# Patient Record
Sex: MALE | Race: WHITE | NOT HISPANIC OR LATINO | Employment: OTHER | URBAN - METROPOLITAN AREA
[De-identification: names, ages, dates, MRNs, and addresses within clinical notes are randomized per-mention and may not be internally consistent; named-entity substitution may affect disease eponyms.]

---

## 2023-02-02 PROBLEM — R53.83 FATIGUE: Status: ACTIVE | Noted: 2023-02-02

## 2023-02-02 PROBLEM — L72.3 SEBACEOUS CYST: Status: ACTIVE | Noted: 2023-02-02

## 2023-02-02 PROBLEM — M19.90 ARTHRITIS: Status: ACTIVE | Noted: 2023-02-02

## 2023-02-02 PROBLEM — M48.062 NEUROGENIC CLAUDICATION DUE TO LUMBAR SPINAL STENOSIS: Status: ACTIVE | Noted: 2023-02-02

## 2023-02-02 PROBLEM — C67.9 MALIGNANT NEOPLASM OF BLADDER, UNSPECIFIED (MULTI): Status: ACTIVE | Noted: 2023-02-02

## 2023-02-02 PROBLEM — E66.3 OVERWEIGHT WITH BODY MASS INDEX (BMI) OF 27 TO 27.9 IN ADULT: Status: ACTIVE | Noted: 2023-02-02

## 2023-02-02 PROBLEM — N18.31 STAGE 3A CHRONIC KIDNEY DISEASE (MULTI): Status: ACTIVE | Noted: 2023-02-02

## 2023-02-02 PROBLEM — E11.9 DIABETES MELLITUS (MULTI): Status: ACTIVE | Noted: 2023-02-02

## 2023-02-02 PROBLEM — G62.9 PERIPHERAL NEUROPATHY: Status: ACTIVE | Noted: 2023-02-02

## 2023-02-02 PROBLEM — E03.9 HYPOTHYROIDISM: Status: ACTIVE | Noted: 2023-02-02

## 2023-02-02 PROBLEM — Z85.46 PERSONAL HISTORY OF MALIGNANT NEOPLASM OF PROSTATE: Status: ACTIVE | Noted: 2023-02-02

## 2023-02-02 PROBLEM — Z86.79 PERSONAL HISTORY OF SUBDURAL HEMATOMA: Status: ACTIVE | Noted: 2023-02-02

## 2023-02-02 PROBLEM — Z86.79 HISTORY OF MITRAL VALVE PROLAPSE: Status: ACTIVE | Noted: 2023-02-02

## 2023-02-02 RX ORDER — FINASTERIDE 1 MG/1
TABLET, FILM COATED ORAL
COMMUNITY

## 2023-02-02 RX ORDER — LEVOTHYROXINE SODIUM 88 UG/1
1 TABLET ORAL DAILY
COMMUNITY
Start: 2019-11-21 | End: 2023-08-14 | Stop reason: SDUPTHER

## 2023-02-02 RX ORDER — ASCORBIC ACID 250 MG
TABLET,CHEWABLE ORAL
COMMUNITY

## 2023-02-02 RX ORDER — PROPRANOLOL HYDROCHLORIDE 10 MG/1
10 TABLET ORAL 2 TIMES DAILY
COMMUNITY
End: 2023-06-15 | Stop reason: SDUPTHER

## 2023-02-02 RX ORDER — UBIDECARENONE 75 MG
CAPSULE ORAL
COMMUNITY
End: 2023-03-16 | Stop reason: SDUPTHER

## 2023-03-07 ENCOUNTER — NURSING HOME VISIT (OUTPATIENT)
Dept: POST ACUTE CARE | Facility: EXTERNAL LOCATION | Age: 88
End: 2023-03-07
Payer: MEDICARE

## 2023-03-07 DIAGNOSIS — R53.1 WEAKNESS: Primary | ICD-10-CM

## 2023-03-07 DIAGNOSIS — M19.90 ARTHRITIS: ICD-10-CM

## 2023-03-07 PROCEDURE — 99308 SBSQ NF CARE LOW MDM 20: CPT | Performed by: NURSE PRACTITIONER

## 2023-03-07 ASSESSMENT — ENCOUNTER SYMPTOMS
DYSURIA: 0
FREQUENCY: 1
FATIGUE: 0
DIFFICULTY URINATING: 0
GASTROINTESTINAL NEGATIVE: 1
FEVER: 0
RESPIRATORY NEGATIVE: 1
CARDIOVASCULAR NEGATIVE: 1
FLANK PAIN: 0
APPETITE CHANGE: 1

## 2023-03-07 NOTE — LETTER
Subjective  Patient ID: Bruce Hernandez is a 89 y.o. male who presents for No chief complaint on file..  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   Resident also has a history of DMII, iron deficiency, insomnia.  Resident was admitted to [facility] rehab unit due to his weakness and confusion after recent dischg from the hospital.  Resident states he is doing much better.  Feels stronger since working with PT.  He can get up with his walker.  States he has pain in lower back and obtained organic pain cream from [facility] which is successful in treating his pain along with occasional Tylenol.  Resident is expecting to go home on Friday (3 days).          Review of Systems   Constitutional:  Positive for appetite change. Negative for fatigue and fever.        States his appetite has improved since admission at [facility].  Reports he is eating almost all of his meals.     HENT: Negative.     Respiratory: Negative.     Cardiovascular: Negative.    Gastrointestinal: Negative.    Genitourinary:  Positive for frequency. Negative for difficulty urinating, dysuria, flank pain and urgency.   Musculoskeletal:  Positive for gait problem.       Objective  Physical Exam  Cardiovascular:      Rate and Rhythm: Normal rate and regular rhythm.      Pulses: Normal pulses.      Heart sounds: Normal heart sounds.      Comments: No edema noted of lower extremities.   Pulmonary:      Effort: Pulmonary effort is normal.      Breath sounds: Normal breath sounds.   Abdominal:      General: Abdomen is flat. Bowel sounds are normal.   Musculoskeletal:      Comments: Upper and lower strength 4/5.  Uses walker at night.  States he uses his cane during the day.     Neurological:      Mental Status: He is alert.   Psychiatric:         Mood and Affect: Mood normal.         Behavior: Behavior normal.          Assessment/Plan  Problem List Items Addressed This Visit    None  Visit Diagnoses       Weakness    -  Primary        Resident improved.  Resident plans on going home on Friday.  Will discuss details with .  Continue same regimen at this time.

## 2023-03-07 NOTE — PROGRESS NOTES
Subjective   Patient ID: Bruce Hernandez is a 89 y.o. male who presents for No chief complaint on file..  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   Resident also has a history of DMII, iron deficiency, insomnia.  Resident was admitted to [facility] rehab unit due to his weakness and confusion after recent dischg from the hospital.  Resident states he is doing much better.  Feels stronger since working with PT.  He can get up with his walker.  States he has pain in lower back and obtained organic pain cream from [facility] which is successful in treating his pain along with occasional Tylenol.  Resident is expecting to go home on Friday (3 days).          Review of Systems   Constitutional:  Positive for appetite change. Negative for fatigue and fever.        States his appetite has improved since admission at [facility].  Reports he is eating almost all of his meals.     HENT: Negative.     Respiratory: Negative.     Cardiovascular: Negative.    Gastrointestinal: Negative.    Genitourinary:  Positive for frequency. Negative for difficulty urinating, dysuria, flank pain and urgency.   Musculoskeletal:  Positive for gait problem.       Objective   Physical Exam  Cardiovascular:      Rate and Rhythm: Normal rate and regular rhythm.      Pulses: Normal pulses.      Heart sounds: Normal heart sounds.      Comments: No edema noted of lower extremities.   Pulmonary:      Effort: Pulmonary effort is normal.      Breath sounds: Normal breath sounds.   Abdominal:      General: Abdomen is flat. Bowel sounds are normal.   Musculoskeletal:      Comments: Upper and lower strength 4/5.  Uses walker at night.  States he uses his cane during the day.     Neurological:      Mental Status: He is alert.   Psychiatric:         Mood and Affect: Mood normal.         Behavior: Behavior normal.          Assessment/Plan   Problem List Items Addressed This Visit    None  Visit Diagnoses       Weakness    -  Primary        Resident improved.  Resident plans on going home on Friday.  Will discuss details with .  Continue same regimen at this time.

## 2023-03-13 ENCOUNTER — TELEPHONE (OUTPATIENT)
Dept: PRIMARY CARE | Facility: CLINIC | Age: 88
End: 2023-03-13

## 2023-03-13 NOTE — TELEPHONE ENCOUNTER
Aby with [facility] calls in asking if PRASHANT will follow and sign for SN, PT, and OT orders.   Janel can be reached at [phone].

## 2023-03-13 NOTE — TELEPHONE ENCOUNTER
Phone call to Aby with detailed message left on vm regarding above.   Aby to the office with questions/concerns.

## 2023-03-15 LAB
ANION GAP IN SER/PLAS: 13 MMOL/L (ref 10–20)
CALCIUM (MG/DL) IN SER/PLAS: 9.5 MG/DL (ref 8.6–10.3)
CARBON DIOXIDE, TOTAL (MMOL/L) IN SER/PLAS: 24 MMOL/L (ref 21–32)
CHLORIDE (MMOL/L) IN SER/PLAS: 103 MMOL/L (ref 98–107)
CREATININE (MG/DL) IN SER/PLAS: 1.77 MG/DL (ref 0.5–1.3)
GFR MALE: 36 ML/MIN/1.73M2
GLUCOSE (MG/DL) IN SER/PLAS: 125 MG/DL (ref 74–99)
POTASSIUM (MMOL/L) IN SER/PLAS: 4.4 MMOL/L (ref 3.5–5.3)
SODIUM (MMOL/L) IN SER/PLAS: 136 MMOL/L (ref 136–145)
URATE (MG/DL) IN SER/PLAS: 8.5 MG/DL (ref 4–7.5)
UREA NITROGEN (MG/DL) IN SER/PLAS: 33 MG/DL (ref 6–23)

## 2023-03-16 ENCOUNTER — OFFICE VISIT (OUTPATIENT)
Dept: PRIMARY CARE | Facility: CLINIC | Age: 88
End: 2023-03-16
Payer: MEDICARE

## 2023-03-16 VITALS — WEIGHT: 174.8 LBS | HEIGHT: 68 IN | BODY MASS INDEX: 26.49 KG/M2

## 2023-03-16 DIAGNOSIS — N18.31 STAGE 3A CHRONIC KIDNEY DISEASE (MULTI): ICD-10-CM

## 2023-03-16 DIAGNOSIS — E11.22 TYPE 2 DIABETES MELLITUS WITH STAGE 3 CHRONIC KIDNEY DISEASE, WITHOUT LONG-TERM CURRENT USE OF INSULIN, UNSPECIFIED WHETHER STAGE 3A OR 3B CKD (MULTI): ICD-10-CM

## 2023-03-16 DIAGNOSIS — Z86.79 PERSONAL HISTORY OF SUBDURAL HEMATOMA: ICD-10-CM

## 2023-03-16 DIAGNOSIS — S06.5X9A TRAUMATIC SUBDURAL HEMORRHAGE WITH LOSS OF CONSCIOUSNESS OF UNSPECIFIED DURATION, INITIAL ENCOUNTER (MULTI): ICD-10-CM

## 2023-03-16 DIAGNOSIS — D50.9 IRON DEFICIENCY ANEMIA, UNSPECIFIED IRON DEFICIENCY ANEMIA TYPE: Primary | ICD-10-CM

## 2023-03-16 DIAGNOSIS — E03.9 ACQUIRED HYPOTHYROIDISM: ICD-10-CM

## 2023-03-16 DIAGNOSIS — N18.30 TYPE 2 DIABETES MELLITUS WITH STAGE 3 CHRONIC KIDNEY DISEASE, WITHOUT LONG-TERM CURRENT USE OF INSULIN, UNSPECIFIED WHETHER STAGE 3A OR 3B CKD (MULTI): ICD-10-CM

## 2023-03-16 DIAGNOSIS — C67.9 MALIGNANT NEOPLASM OF URINARY BLADDER, UNSPECIFIED SITE (MULTI): ICD-10-CM

## 2023-03-16 PROCEDURE — 1157F ADVNC CARE PLAN IN RCRD: CPT | Performed by: FAMILY MEDICINE

## 2023-03-16 PROCEDURE — 1159F MED LIST DOCD IN RCRD: CPT | Performed by: FAMILY MEDICINE

## 2023-03-16 PROCEDURE — 1036F TOBACCO NON-USER: CPT | Performed by: FAMILY MEDICINE

## 2023-03-16 PROCEDURE — 99214 OFFICE O/P EST MOD 30 MIN: CPT | Performed by: FAMILY MEDICINE

## 2023-03-16 PROCEDURE — 1160F RVW MEDS BY RX/DR IN RCRD: CPT | Performed by: FAMILY MEDICINE

## 2023-03-16 RX ORDER — FERROUS SULFATE 325(65) MG
65 TABLET, DELAYED RELEASE (ENTERIC COATED) ORAL
Qty: 90 TABLET | Refills: 1 | Status: SHIPPED | OUTPATIENT
Start: 2023-03-16 | End: 2023-07-21 | Stop reason: SDUPTHER

## 2023-03-16 RX ORDER — DOCUSATE SODIUM 100 MG/1
100 CAPSULE, LIQUID FILLED ORAL 2 TIMES DAILY
COMMUNITY
Start: 2013-03-26 | End: 2024-02-07 | Stop reason: ALTCHOICE

## 2023-03-16 RX ORDER — LANOLIN ALCOHOL/MO/W.PET/CERES
1000 CREAM (GRAM) TOPICAL
COMMUNITY
Start: 2013-03-26

## 2023-03-16 ASSESSMENT — ENCOUNTER SYMPTOMS: FATIGUE: 1

## 2023-03-16 NOTE — PATIENT INSTRUCTIONS
Continue current medications - will add iron.  Check labs in 2 weeks to reevaluate kidney function.  Follow up here in 3 months, sooner if needed.

## 2023-03-16 NOTE — TELEPHONE ENCOUNTER
Byron with [facility] message that pt will be seen for PT x2/3 weeks and then x1/3weeks.     [phone].

## 2023-03-16 NOTE — PROGRESS NOTES
Subjective   Bruce Hernandez is a 89 y.o. male who presents for Diabetes, Arthritis, Hypothyroidism, Fatigue, and Chronic Kidney Disease.  Here with his daughter for f/u DM, hypothyroidism, h/o subdural hematoma, bladder cancer.  He was recently hospitalized (end of Feb) for UTI, weakness.  He did go to [facility] for a little bit and is home now.  His daughter states that he has lost some weight, but some of that was swelling.  Working with PT he has been moving more and is not having swelling.  He did have labs done through [facility] when he was at [facility].  He was started on iron in [facility].  He had some labs done yesterday and his kidney function is a little worse, he continues to follow up with Dr Jurado - will repeat labs in 2 weeks.  We also discussed making sure he gets enough water/fluids.  He is feeling pretty good since he has been home, does have home health coming in as well.      He does have a DNR-CCA now - his daughter states that they will bring in the form for us.     Diabetes  Associated symptoms include fatigue.   Arthritis  Associated symptoms include fatigue.   Fatigue  Associated symptoms include fatigue.       Review of Systems   Constitutional:  Positive for fatigue.   Musculoskeletal:  Positive for arthritis.       Objective   There were no vitals taken for this visit.   Physical Exam  Vitals reviewed.   Constitutional:       General: He is not in acute distress.  Cardiovascular:      Rate and Rhythm: Normal rate and regular rhythm.      Heart sounds: No murmur heard.  Pulmonary:      Effort: Pulmonary effort is normal. No respiratory distress.      Breath sounds: Normal breath sounds.   Skin:     General: Skin is warm and dry.   Neurological:      General: No focal deficit present.      Mental Status: He is alert. Mental status is at baseline.        Latest Reference Range & Units 03/15/23 09:00   GLUCOSE 74 - 99 mg/dL 125 (H)   SODIUM 136 - 145 mmol/L 136   POTASSIUM 3.5 - 5.3  mmol/L 4.4   CHLORIDE 98 - 107 mmol/L 103   Bicarbonate 21 - 32 mmol/L 24   Anion Gap 10 - 20 mmol/L 13   Blood Urea Nitrogen 6 - 23 mg/dL 33 (H)   Creatinine 0.50 - 1.30 mg/dL 1.77 (H)   Calcium 8.6 - 10.3 mg/dL 9.5   URIC ACID 4.0 - 7.5 mg/dL 8.5 (H)   GFR MALE >90 mL/min/1.73m2 36 !   (H): Data is abnormally high  !: Data is abnormal    Assessment/Plan   Problem List Items Addressed This Visit          Nervous    Traumatic subdural hemorrhage with loss of consciousness of unspecified duration, initial encounter (CMS/[facility])       Genitourinary    Malignant neoplasm of bladder, unspecified (CMS/[facility])    Stage 3a chronic kidney disease    Relevant Orders    Comprehensive Metabolic Panel       Endocrine/Metabolic    Diabetes mellitus (CMS/[facility])    Hypothyroidism       Other    Personal history of subdural hematoma     Other Visit Diagnoses       Iron deficiency anemia, unspecified iron deficiency anemia type    -  Primary    Relevant Medications    ferrous sulfate 325 (65 Fe) MG EC tablet               Natalia Dejesus MD

## 2023-03-23 ENCOUNTER — TELEPHONE (OUTPATIENT)
Dept: PRIMARY CARE | Facility: CLINIC | Age: 88
End: 2023-03-23

## 2023-03-23 DIAGNOSIS — N18.31 STAGE 3A CHRONIC KIDNEY DISEASE (MULTI): ICD-10-CM

## 2023-03-23 DIAGNOSIS — N39.0 RECURRENT UTI: Primary | ICD-10-CM

## 2023-03-23 DIAGNOSIS — C67.9 MALIGNANT NEOPLASM OF URINARY BLADDER, UNSPECIFIED SITE (MULTI): ICD-10-CM

## 2023-03-23 NOTE — TELEPHONE ENCOUNTER
OK, I updated the orders for urinalysis and urine culture and we can send them the orders that were placed at his last visit.  Thanks.

## 2023-03-23 NOTE — TELEPHONE ENCOUNTER
Cynthia with [facility] calls in asking for lab orders to be faxed to [phone].   They will draw pt next week.  Also, Cynthia states pt is requesting a UA, states urine has been concentrated, but does clear after he drinks water.

## 2023-04-04 ENCOUNTER — TELEPHONE (OUTPATIENT)
Dept: PRIMARY CARE | Facility: CLINIC | Age: 88
End: 2023-04-04

## 2023-04-04 NOTE — TELEPHONE ENCOUNTER
Phone call received from Hermelinda, Pt's daughter, in regards to pt's labs.  Phone call back to Hermelinda that all labs that PRASHANT ordered on 03/16/23 were faxed to [facility] as requsted on 03/23/23, x2.   Hermelinda asked to call the office if further concerns/questions.

## 2023-04-05 ENCOUNTER — TELEPHONE (OUTPATIENT)
Dept: PRIMARY CARE | Facility: CLINIC | Age: 88
End: 2023-04-05

## 2023-04-05 ENCOUNTER — LAB (OUTPATIENT)
Dept: LAB | Facility: LAB | Age: 88
End: 2023-04-05
Payer: MEDICARE

## 2023-04-05 DIAGNOSIS — N39.0 RECURRENT UTI: ICD-10-CM

## 2023-04-05 LAB
APPEARANCE, URINE: ABNORMAL
BILIRUBIN, URINE: NEGATIVE
BLOOD, URINE: ABNORMAL
COLOR, URINE: ABNORMAL
GLUCOSE, URINE: NEGATIVE MG/DL
KETONES, URINE: NEGATIVE MG/DL
LEUKOCYTE ESTERASE, URINE: NEGATIVE
MUCUS, URINE: ABNORMAL /LPF
NITRITE, URINE: NEGATIVE
PH, URINE: 5 (ref 5–8)
PROTEIN, URINE: ABNORMAL MG/DL
RBC, URINE: >182 /HPF (ref 0–5)
SPECIFIC GRAVITY, URINE: 1.02 (ref 1–1.03)
UROBILINOGEN, URINE: <2 MG/DL (ref 0–1.9)
WBC, URINE: 66 /HPF (ref 0–5)

## 2023-04-05 PROCEDURE — 81001 URINALYSIS AUTO W/SCOPE: CPT

## 2023-04-05 NOTE — TELEPHONE ENCOUNTER
Hermelinda said she took urine to the [facility]. today at 1:00pm. She will take him for lab work tomorrow or Friday.  She said he does have some blood in his urine again.    This is just for your information, per patient's daughter, so you know what's going on.

## 2023-04-06 ENCOUNTER — LAB (OUTPATIENT)
Dept: LAB | Facility: LAB | Age: 88
End: 2023-04-06
Payer: MEDICARE

## 2023-04-06 DIAGNOSIS — N18.31 STAGE 3A CHRONIC KIDNEY DISEASE (MULTI): ICD-10-CM

## 2023-04-06 LAB
ALANINE AMINOTRANSFERASE (SGPT) (U/L) IN SER/PLAS: 5 U/L (ref 10–52)
ALBUMIN (G/DL) IN SER/PLAS: 3.8 G/DL (ref 3.4–5)
ALKALINE PHOSPHATASE (U/L) IN SER/PLAS: 46 U/L (ref 33–136)
ANION GAP IN SER/PLAS: 12 MMOL/L (ref 10–20)
ASPARTATE AMINOTRANSFERASE (SGOT) (U/L) IN SER/PLAS: 9 U/L (ref 9–39)
BILIRUBIN TOTAL (MG/DL) IN SER/PLAS: 0.5 MG/DL (ref 0–1.2)
CALCIUM (MG/DL) IN SER/PLAS: 9.1 MG/DL (ref 8.6–10.3)
CARBON DIOXIDE, TOTAL (MMOL/L) IN SER/PLAS: 24 MMOL/L (ref 21–32)
CHLORIDE (MMOL/L) IN SER/PLAS: 108 MMOL/L (ref 98–107)
CREATININE (MG/DL) IN SER/PLAS: 1.75 MG/DL (ref 0.5–1.3)
GFR MALE: 37 ML/MIN/1.73M2
GLUCOSE (MG/DL) IN SER/PLAS: 118 MG/DL (ref 74–99)
POTASSIUM (MMOL/L) IN SER/PLAS: 4.5 MMOL/L (ref 3.5–5.3)
PROTEIN TOTAL: 6.9 G/DL (ref 6.4–8.2)
SODIUM (MMOL/L) IN SER/PLAS: 139 MMOL/L (ref 136–145)
UREA NITROGEN (MG/DL) IN SER/PLAS: 29 MG/DL (ref 6–23)

## 2023-04-06 PROCEDURE — 36415 COLL VENOUS BLD VENIPUNCTURE: CPT

## 2023-04-06 PROCEDURE — 80053 COMPREHEN METABOLIC PANEL: CPT

## 2023-04-07 ENCOUNTER — TELEPHONE (OUTPATIENT)
Dept: PRIMARY CARE | Facility: CLINIC | Age: 88
End: 2023-04-07

## 2023-04-07 DIAGNOSIS — N18.31 STAGE 3A CHRONIC KIDNEY DISEASE (MULTI): ICD-10-CM

## 2023-04-07 NOTE — TELEPHONE ENCOUNTER
Hermelinda returns calls, she is agreeable to referral to Dr. Jurado.  She will have pt increase water, states pt is doing fine otherwise.

## 2023-04-07 NOTE — TELEPHONE ENCOUNTER
----- Message from Natalia Dejesus MD sent at 4/7/2023  1:13 PM EDT -----  Please let patient/daughter know that his urine did have blood in it, I would suggest follow up with urology regarding that, his kidney function is still impaired as well similar to what it was a couple of weeks ago.  He can try to increase water intake or we can refer to Dr Jurado (nephrology) for further suggestions.  Thanks.

## 2023-04-07 NOTE — TELEPHONE ENCOUNTER
Phone call to pt's daughter, Hermelinda, with detailed message left on identified vm.  Hermelinda asked to rtc if they are interested in nephrology referral.

## 2023-04-14 LAB
APPEARANCE, URINE: ABNORMAL
BILIRUBIN, URINE: NEGATIVE
BLOOD, URINE: ABNORMAL
COLOR, URINE: YELLOW
GLUCOSE, URINE: NEGATIVE MG/DL
KETONES, URINE: NEGATIVE MG/DL
LEUKOCYTE ESTERASE, URINE: NEGATIVE
MUCUS, URINE: ABNORMAL /LPF
NITRITE, URINE: NEGATIVE
PH, URINE: 5 (ref 5–8)
PROTEIN, URINE: NEGATIVE MG/DL
RBC, URINE: 51 /HPF (ref 0–5)
SPECIFIC GRAVITY, URINE: 1.02 (ref 1–1.03)
SQUAMOUS EPITHELIAL CELLS, URINE: <1 /HPF
UROBILINOGEN, URINE: <2 MG/DL (ref 0–1.9)
WBC, URINE: 10 /HPF (ref 0–5)

## 2023-04-15 LAB
ANION GAP IN SER/PLAS: 11 MMOL/L (ref 10–20)
CALCIUM (MG/DL) IN SER/PLAS: 9 MG/DL (ref 8.6–10.3)
CARBON DIOXIDE, TOTAL (MMOL/L) IN SER/PLAS: 23 MMOL/L (ref 21–32)
CHLORIDE (MMOL/L) IN SER/PLAS: 108 MMOL/L (ref 98–107)
CREATININE (MG/DL) IN SER/PLAS: 1.62 MG/DL (ref 0.5–1.3)
GFR MALE: 40 ML/MIN/1.73M2
GLUCOSE (MG/DL) IN SER/PLAS: 121 MG/DL (ref 74–99)
POTASSIUM (MMOL/L) IN SER/PLAS: 4.3 MMOL/L (ref 3.5–5.3)
SODIUM (MMOL/L) IN SER/PLAS: 138 MMOL/L (ref 136–145)
UREA NITROGEN (MG/DL) IN SER/PLAS: 37 MG/DL (ref 6–23)

## 2023-06-15 ENCOUNTER — APPOINTMENT (OUTPATIENT)
Dept: PRIMARY CARE | Facility: CLINIC | Age: 88
End: 2023-06-15
Payer: MEDICARE

## 2023-06-15 DIAGNOSIS — Z86.79 HISTORY OF MITRAL VALVE PROLAPSE: Primary | ICD-10-CM

## 2023-06-16 RX ORDER — PROPRANOLOL HYDROCHLORIDE 10 MG/1
10 TABLET ORAL 2 TIMES DAILY
Qty: 180 TABLET | Refills: 1 | Status: SHIPPED | OUTPATIENT
Start: 2023-06-16 | End: 2023-12-12 | Stop reason: SDUPTHER

## 2023-06-29 ENCOUNTER — TELEPHONE (OUTPATIENT)
Dept: PRIMARY CARE | Facility: CLINIC | Age: 88
End: 2023-06-29

## 2023-06-29 ENCOUNTER — APPOINTMENT (OUTPATIENT)
Dept: PRIMARY CARE | Facility: CLINIC | Age: 88
End: 2023-06-29
Payer: MEDICARE

## 2023-07-17 ENCOUNTER — TELEPHONE (OUTPATIENT)
Dept: PRIMARY CARE | Facility: CLINIC | Age: 88
End: 2023-07-17
Payer: MEDICARE

## 2023-07-17 DIAGNOSIS — N18.31 STAGE 3A CHRONIC KIDNEY DISEASE (MULTI): Primary | ICD-10-CM

## 2023-07-17 DIAGNOSIS — D64.9 ANEMIA, UNSPECIFIED TYPE: ICD-10-CM

## 2023-07-17 NOTE — TELEPHONE ENCOUNTER
PT'S DAUGHTER YUDITH CALLED TO MAKE A FOLLOWUP APPT FOR PT FROM RECENT ER VISIT. SHE IS ASKING IF YOU'LL ORDER A CBC SINCE WAS DIAGNOSED WITH ANEMIA AND STILL HAS FATIGUE.  YUDITH CAN BE REACHED [phone].

## 2023-07-19 ENCOUNTER — LAB (OUTPATIENT)
Dept: LAB | Facility: LAB | Age: 88
End: 2023-07-19
Payer: MEDICARE

## 2023-07-19 DIAGNOSIS — C67.9 MALIGNANT NEOPLASM OF URINARY BLADDER, UNSPECIFIED SITE (MULTI): ICD-10-CM

## 2023-07-19 DIAGNOSIS — E03.9 ACQUIRED HYPOTHYROIDISM: ICD-10-CM

## 2023-07-19 DIAGNOSIS — N18.31 STAGE 3A CHRONIC KIDNEY DISEASE (MULTI): ICD-10-CM

## 2023-07-19 DIAGNOSIS — D50.9 IRON DEFICIENCY ANEMIA, UNSPECIFIED IRON DEFICIENCY ANEMIA TYPE: ICD-10-CM

## 2023-07-19 DIAGNOSIS — S06.5X9A TRAUMATIC SUBDURAL HEMORRHAGE WITH LOSS OF CONSCIOUSNESS OF UNSPECIFIED DURATION, INITIAL ENCOUNTER (MULTI): ICD-10-CM

## 2023-07-19 DIAGNOSIS — E11.22 TYPE 2 DIABETES MELLITUS WITH STAGE 3 CHRONIC KIDNEY DISEASE, WITHOUT LONG-TERM CURRENT USE OF INSULIN, UNSPECIFIED WHETHER STAGE 3A OR 3B CKD (MULTI): ICD-10-CM

## 2023-07-19 DIAGNOSIS — N18.30 TYPE 2 DIABETES MELLITUS WITH STAGE 3 CHRONIC KIDNEY DISEASE, WITHOUT LONG-TERM CURRENT USE OF INSULIN, UNSPECIFIED WHETHER STAGE 3A OR 3B CKD (MULTI): ICD-10-CM

## 2023-07-19 DIAGNOSIS — D64.9 ANEMIA, UNSPECIFIED TYPE: ICD-10-CM

## 2023-07-19 DIAGNOSIS — Z86.79 PERSONAL HISTORY OF SUBDURAL HEMATOMA: ICD-10-CM

## 2023-07-19 LAB
ALANINE AMINOTRANSFERASE (SGPT) (U/L) IN SER/PLAS: 7 U/L (ref 10–52)
ALBUMIN (G/DL) IN SER/PLAS: 3.1 G/DL (ref 3.4–5)
ALKALINE PHOSPHATASE (U/L) IN SER/PLAS: 55 U/L (ref 33–136)
ANION GAP IN SER/PLAS: 12 MMOL/L (ref 10–20)
ASPARTATE AMINOTRANSFERASE (SGOT) (U/L) IN SER/PLAS: 12 U/L (ref 9–39)
BASOPHILS (10*3/UL) IN BLOOD BY AUTOMATED COUNT: 0.02 X10E9/L (ref 0–0.1)
BASOPHILS/100 LEUKOCYTES IN BLOOD BY AUTOMATED COUNT: 0.2 % (ref 0–2)
BILIRUBIN TOTAL (MG/DL) IN SER/PLAS: 0.4 MG/DL (ref 0–1.2)
CALCIUM (MG/DL) IN SER/PLAS: 8.2 MG/DL (ref 8.6–10.3)
CARBON DIOXIDE, TOTAL (MMOL/L) IN SER/PLAS: 23 MMOL/L (ref 21–32)
CHLORIDE (MMOL/L) IN SER/PLAS: 108 MMOL/L (ref 98–107)
CHOLESTEROL (MG/DL) IN SER/PLAS: 114 MG/DL (ref 0–199)
CHOLESTEROL IN HDL (MG/DL) IN SER/PLAS: 33 MG/DL
CHOLESTEROL/HDL RATIO: 3.5
COBALAMIN (VITAMIN B12) (PG/ML) IN SER/PLAS: 891 PG/ML (ref 211–911)
CREATININE (MG/DL) IN SER/PLAS: 1.61 MG/DL (ref 0.5–1.3)
EOSINOPHILS (10*3/UL) IN BLOOD BY AUTOMATED COUNT: 0.16 X10E9/L (ref 0–0.4)
EOSINOPHILS/100 LEUKOCYTES IN BLOOD BY AUTOMATED COUNT: 1.8 % (ref 0–6)
ERYTHROCYTE DISTRIBUTION WIDTH (RATIO) BY AUTOMATED COUNT: 13.4 % (ref 11.5–14.5)
ERYTHROCYTE MEAN CORPUSCULAR HEMOGLOBIN CONCENTRATION (G/DL) BY AUTOMATED: 31.3 G/DL (ref 32–36)
ERYTHROCYTE MEAN CORPUSCULAR VOLUME (FL) BY AUTOMATED COUNT: 106 FL (ref 80–100)
ERYTHROCYTES (10*6/UL) IN BLOOD BY AUTOMATED COUNT: 2.84 X10E12/L (ref 4.5–5.9)
ESTIMATED AVERAGE GLUCOSE FOR HBA1C: 143 MG/DL
FERRITIN (UG/LL) IN SER/PLAS: 290 UG/L (ref 20–300)
GFR MALE: 40 ML/MIN/1.73M2
GLUCOSE (MG/DL) IN SER/PLAS: 134 MG/DL (ref 74–99)
HEMATOCRIT (%) IN BLOOD BY AUTOMATED COUNT: 30 % (ref 41–52)
HEMOGLOBIN (G/DL) IN BLOOD: 9.4 G/DL (ref 13.5–17.5)
HEMOGLOBIN A1C/HEMOGLOBIN TOTAL IN BLOOD: 6.6 %
IMMATURE GRANULOCYTES/100 LEUKOCYTES IN BLOOD BY AUTOMATED COUNT: 0.5 % (ref 0–0.9)
IRON (UG/DL) IN SER/PLAS: 23 UG/DL (ref 35–150)
IRON BINDING CAPACITY (UG/DL) IN SER/PLAS: 207 UG/DL (ref 240–445)
IRON SATURATION (%) IN SER/PLAS: 11 % (ref 25–45)
LDL: 61 MG/DL (ref 0–99)
LEUKOCYTES (10*3/UL) IN BLOOD BY AUTOMATED COUNT: 8.8 X10E9/L (ref 4.4–11.3)
LYMPHOCYTES (10*3/UL) IN BLOOD BY AUTOMATED COUNT: 1.03 X10E9/L (ref 0.8–3)
LYMPHOCYTES/100 LEUKOCYTES IN BLOOD BY AUTOMATED COUNT: 11.7 % (ref 13–44)
MONOCYTES (10*3/UL) IN BLOOD BY AUTOMATED COUNT: 1.09 X10E9/L (ref 0.05–0.8)
MONOCYTES/100 LEUKOCYTES IN BLOOD BY AUTOMATED COUNT: 12.4 % (ref 2–10)
NEUTROPHILS (10*3/UL) IN BLOOD BY AUTOMATED COUNT: 6.43 X10E9/L (ref 1.6–5.5)
NEUTROPHILS/100 LEUKOCYTES IN BLOOD BY AUTOMATED COUNT: 73.4 % (ref 40–80)
PLATELETS (10*3/UL) IN BLOOD AUTOMATED COUNT: 278 X10E9/L (ref 150–450)
POTASSIUM (MMOL/L) IN SER/PLAS: 4.2 MMOL/L (ref 3.5–5.3)
PROTEIN TOTAL: 6.1 G/DL (ref 6.4–8.2)
SODIUM (MMOL/L) IN SER/PLAS: 139 MMOL/L (ref 136–145)
THYROTROPIN (MIU/L) IN SER/PLAS BY DETECTION LIMIT <= 0.05 MIU/L: 5.18 MIU/L (ref 0.44–3.98)
THYROXINE (T4) FREE (NG/DL) IN SER/PLAS: 1.12 NG/DL (ref 0.61–1.12)
TRIGLYCERIDE (MG/DL) IN SER/PLAS: 100 MG/DL (ref 0–149)
UREA NITROGEN (MG/DL) IN SER/PLAS: 31 MG/DL (ref 6–23)
VLDL: 20 MG/DL (ref 0–40)

## 2023-07-19 PROCEDURE — 80053 COMPREHEN METABOLIC PANEL: CPT

## 2023-07-19 PROCEDURE — 80061 LIPID PANEL: CPT

## 2023-07-19 PROCEDURE — 84443 ASSAY THYROID STIM HORMONE: CPT

## 2023-07-19 PROCEDURE — 84439 ASSAY OF FREE THYROXINE: CPT

## 2023-07-19 PROCEDURE — 85025 COMPLETE CBC W/AUTO DIFF WBC: CPT

## 2023-07-19 PROCEDURE — 83036 HEMOGLOBIN GLYCOSYLATED A1C: CPT

## 2023-07-19 PROCEDURE — 83550 IRON BINDING TEST: CPT

## 2023-07-19 PROCEDURE — 82728 ASSAY OF FERRITIN: CPT

## 2023-07-19 PROCEDURE — 36415 COLL VENOUS BLD VENIPUNCTURE: CPT

## 2023-07-19 PROCEDURE — 82607 VITAMIN B-12: CPT

## 2023-07-19 PROCEDURE — 83540 ASSAY OF IRON: CPT

## 2023-07-21 ENCOUNTER — OFFICE VISIT (OUTPATIENT)
Dept: PRIMARY CARE | Facility: CLINIC | Age: 88
End: 2023-07-21
Payer: MEDICARE

## 2023-07-21 VITALS
HEIGHT: 68 IN | WEIGHT: 173.9 LBS | BODY MASS INDEX: 26.36 KG/M2 | OXYGEN SATURATION: 97 % | HEART RATE: 72 BPM | DIASTOLIC BLOOD PRESSURE: 58 MMHG | SYSTOLIC BLOOD PRESSURE: 98 MMHG

## 2023-07-21 DIAGNOSIS — D50.9 IRON DEFICIENCY ANEMIA, UNSPECIFIED IRON DEFICIENCY ANEMIA TYPE: Primary | ICD-10-CM

## 2023-07-21 DIAGNOSIS — E66.3 OVERWEIGHT WITH BODY MASS INDEX (BMI) OF 26 TO 26.9 IN ADULT: ICD-10-CM

## 2023-07-21 DIAGNOSIS — N18.31 STAGE 3A CHRONIC KIDNEY DISEASE (MULTI): ICD-10-CM

## 2023-07-21 DIAGNOSIS — N18.30 TYPE 2 DIABETES MELLITUS WITH STAGE 3 CHRONIC KIDNEY DISEASE, WITHOUT LONG-TERM CURRENT USE OF INSULIN, UNSPECIFIED WHETHER STAGE 3A OR 3B CKD (MULTI): ICD-10-CM

## 2023-07-21 DIAGNOSIS — E11.22 TYPE 2 DIABETES MELLITUS WITH STAGE 3 CHRONIC KIDNEY DISEASE, WITHOUT LONG-TERM CURRENT USE OF INSULIN, UNSPECIFIED WHETHER STAGE 3A OR 3B CKD (MULTI): ICD-10-CM

## 2023-07-21 DIAGNOSIS — E03.9 ACQUIRED HYPOTHYROIDISM: ICD-10-CM

## 2023-07-21 PROBLEM — L72.3 SEBACEOUS CYST: Status: RESOLVED | Noted: 2023-02-02 | Resolved: 2023-07-21

## 2023-07-21 PROBLEM — S06.5X9A TRAUMATIC SUBDURAL HEMORRHAGE WITH LOSS OF CONSCIOUSNESS OF UNSPECIFIED DURATION, INITIAL ENCOUNTER (MULTI): Status: RESOLVED | Noted: 2023-03-16 | Resolved: 2023-07-21

## 2023-07-21 PROCEDURE — 1036F TOBACCO NON-USER: CPT | Performed by: FAMILY MEDICINE

## 2023-07-21 PROCEDURE — 99214 OFFICE O/P EST MOD 30 MIN: CPT | Performed by: FAMILY MEDICINE

## 2023-07-21 PROCEDURE — 1160F RVW MEDS BY RX/DR IN RCRD: CPT | Performed by: FAMILY MEDICINE

## 2023-07-21 PROCEDURE — 1159F MED LIST DOCD IN RCRD: CPT | Performed by: FAMILY MEDICINE

## 2023-07-21 PROCEDURE — 1157F ADVNC CARE PLAN IN RCRD: CPT | Performed by: FAMILY MEDICINE

## 2023-07-21 RX ORDER — FERROUS SULFATE 325(65) MG
65 TABLET, DELAYED RELEASE (ENTERIC COATED) ORAL 2 TIMES DAILY
Qty: 180 TABLET | Refills: 1 | Status: SHIPPED | OUTPATIENT
Start: 2023-07-21 | End: 2023-09-05 | Stop reason: SDUPTHER

## 2023-07-21 NOTE — PROGRESS NOTES
Subjective   Patient ID: Lucio Hernandez is a 89 y.o. male who presents for ED follow up to UTI  very mild dehydration.     HPI     Review of Systems    Objective   There were no vitals taken for this visit.    Physical Exam    Assessment/Plan

## 2023-07-21 NOTE — PATIENT INSTRUCTIONS
Continue current medications - will increase iron to twice a day, may also add a Boost/Ensure daily as well, encouraged to drink fluids.  Follow up with specialists as scheduled.  Follow up in 3 months, sooner if needed.

## 2023-07-21 NOTE — PROGRESS NOTES
Subjective   Lucio Hernandez is a 89 y.o. male who presents for No chief complaint on file..  Here for follow up recent ER visit for UTI and dehydration.  They have been working on trying to drink more water when his urine is darker.  He has some issues when he goes out to mow he does not drink as much.  He did see Dr Goff in June and his cystoscopy was ok.  About a week after that he wasn't feeling well, had increased weakness and so they went to the ER.  He was given some IV fluids and antibiotics and is slowly improving.              Objective   Visit Vitals  BP 98/58 (BP Location: Left arm, Patient Position: Sitting, BP Cuff Size: Adult)   Pulse 72      Physical Exam  Vitals reviewed.   Constitutional:       General: He is not in acute distress.  Pulmonary:      Effort: Pulmonary effort is normal. No respiratory distress.   Skin:     General: Skin is warm and dry.   Neurological:      General: No focal deficit present.      Mental Status: He is alert. Mental status is at baseline.        Latest Reference Range & Units 07/19/23 09:07   GLUCOSE 74 - 99 mg/dL 134 (H)   SODIUM 136 - 145 mmol/L 139   POTASSIUM 3.5 - 5.3 mmol/L 4.2   CHLORIDE 98 - 107 mmol/L 108 (H)   Bicarbonate 21 - 32 mmol/L 23   Anion Gap 10 - 20 mmol/L 12   Blood Urea Nitrogen 6 - 23 mg/dL 31 (H)   Creatinine 0.50 - 1.30 mg/dL 1.61 (H)   Calcium 8.6 - 10.3 mg/dL 8.2 (L)   Albumin 3.4 - 5.0 g/dL 3.1 (L)   Alkaline Phosphatase 33 - 136 U/L 55   ALT 10 - 52 U/L 7 (L)   AST 9 - 39 U/L 12   Bilirubin Total 0.0 - 1.2 mg/dL 0.4   HDL CHOLESTEROL mg/dL 33.0 !   Cholesterol/HDL Ratio  3.5   VLDL 0 - 40 mg/dL 20   TRIGLYCERIDES 0 - 149 mg/dL 100   FERRITIN 20 - 300 ug/L 290   Total Protein 6.4 - 8.2 g/dL 6.1 (L)   IRON 35 - 150 ug/dL 23 (L)   CHOLESTEROL 0 - 199 mg/dL 114   LDL 0 - 99 mg/dL 61   TIBC 240 - 445 ug/dL 207 (L)   % Saturation 25 - 45 % 11 (L)   GFR MALE >90 mL/min/1.73m2 40 !   Vitamin B12 211 - 911 pg/mL 891   Hemoglobin A1C % 6.6 !    Thyroxine, Free 0.61 - 1.12 ng/dL 1.12   Thyroid Stimulating Hormone 0.44 - 3.98 mIU/L 5.18 (H)   Estimated Average Glucose MG/   WBC 4.4 - 11.3 x10E9/L 8.8   RBC 4.50 - 5.90 x10E12/L 2.84 (L)   HEMOGLOBIN 13.5 - 17.5 g/dL 9.4 (L)   HEMATOCRIT 41.0 - 52.0 % 30.0 (L)   MCV 80 - 100 fL 106 (H)   MCHC 32.0 - 36.0 g/dL 31.3 (L)   Platelets 150 - 450 x10E9/L 278   Neutrophils % 40.0 - 80.0 % 73.4   Immature Granulocytes %, Automated 0.0 - 0.9 % 0.5   Lymphocytes % 13.0 - 44.0 % 11.7   Monocytes % 2.0 - 10.0 % 12.4   Eosinophils % 0.0 - 6.0 % 1.8   Basophils % 0.0 - 2.0 % 0.2   Neutrophils Absolute 1.60 - 5.50 x10E9/L 6.43 (H)   Lymphocytes Absolute 0.80 - 3.00 x10E9/L 1.03   Monocytes Absolute 0.05 - 0.80 x10E9/L 1.09 (H)   Eosinophils Absolute 0.00 - 0.40 x10E9/L 0.16   Basophils Absolute 0.00 - 0.10 x10E9/L 0.02   RED CELL DISTRIBUTION WIDTH 11.5 - 14.5 % 13.4   (H): Data is abnormally high  (L): Data is abnormally low  !: Data is abnormal    Assessment/Plan   Problem List Items Addressed This Visit       Diabetes mellitus (CMS/HCC)    Hypothyroidism    Overweight with body mass index (BMI) of 26 to 26.9 in adult    Stage 3a chronic kidney disease    Iron deficiency anemia - Primary    Relevant Medications    ferrous sulfate 325 (65 Fe) MG EC tablet          Natalia Dejesus MD

## 2023-08-14 DIAGNOSIS — E03.9 ACQUIRED HYPOTHYROIDISM: ICD-10-CM

## 2023-08-14 RX ORDER — LEVOTHYROXINE SODIUM 88 UG/1
88 TABLET ORAL
Qty: 90 TABLET | Refills: 1 | Status: SHIPPED | OUTPATIENT
Start: 2023-08-14 | End: 2024-02-06 | Stop reason: SDUPTHER

## 2023-09-05 DIAGNOSIS — D50.9 IRON DEFICIENCY ANEMIA, UNSPECIFIED IRON DEFICIENCY ANEMIA TYPE: ICD-10-CM

## 2023-09-05 RX ORDER — FERROUS SULFATE 325(65) MG
325 TABLET, DELAYED RELEASE (ENTERIC COATED) ORAL 2 TIMES DAILY
Qty: 180 TABLET | Refills: 1 | Status: SHIPPED | OUTPATIENT
Start: 2023-09-05 | End: 2024-03-03

## 2023-10-11 ENCOUNTER — LAB (OUTPATIENT)
Dept: LAB | Facility: LAB | Age: 88
End: 2023-10-11
Payer: COMMERCIAL

## 2023-10-11 DIAGNOSIS — N18.30 TYPE 2 DIABETES MELLITUS WITH STAGE 3 CHRONIC KIDNEY DISEASE, WITHOUT LONG-TERM CURRENT USE OF INSULIN, UNSPECIFIED WHETHER STAGE 3A OR 3B CKD (MULTI): Primary | ICD-10-CM

## 2023-10-11 DIAGNOSIS — E03.9 ACQUIRED HYPOTHYROIDISM: ICD-10-CM

## 2023-10-11 DIAGNOSIS — D50.9 IRON DEFICIENCY ANEMIA, UNSPECIFIED IRON DEFICIENCY ANEMIA TYPE: ICD-10-CM

## 2023-10-11 DIAGNOSIS — E11.22 TYPE 2 DIABETES MELLITUS WITH STAGE 3 CHRONIC KIDNEY DISEASE, WITHOUT LONG-TERM CURRENT USE OF INSULIN, UNSPECIFIED WHETHER STAGE 3A OR 3B CKD (MULTI): ICD-10-CM

## 2023-10-11 DIAGNOSIS — N18.31 STAGE 3A CHRONIC KIDNEY DISEASE (MULTI): ICD-10-CM

## 2023-10-11 DIAGNOSIS — N18.30 TYPE 2 DIABETES MELLITUS WITH STAGE 3 CHRONIC KIDNEY DISEASE, WITHOUT LONG-TERM CURRENT USE OF INSULIN, UNSPECIFIED WHETHER STAGE 3A OR 3B CKD (MULTI): ICD-10-CM

## 2023-10-11 DIAGNOSIS — E11.22 TYPE 2 DIABETES MELLITUS WITH STAGE 3 CHRONIC KIDNEY DISEASE, WITHOUT LONG-TERM CURRENT USE OF INSULIN, UNSPECIFIED WHETHER STAGE 3A OR 3B CKD (MULTI): Primary | ICD-10-CM

## 2023-10-11 LAB
ALBUMIN SERPL BCP-MCNC: 3.8 G/DL (ref 3.4–5)
ALP SERPL-CCNC: 47 U/L (ref 33–136)
ALT SERPL W P-5'-P-CCNC: 6 U/L (ref 10–52)
ANION GAP SERPL CALC-SCNC: 12 MMOL/L (ref 10–20)
AST SERPL W P-5'-P-CCNC: 9 U/L (ref 9–39)
BASOPHILS # BLD AUTO: 0.02 X10*3/UL (ref 0–0.1)
BASOPHILS NFR BLD AUTO: 0.3 %
BILIRUB SERPL-MCNC: 0.4 MG/DL (ref 0–1.2)
BUN SERPL-MCNC: 31 MG/DL (ref 6–23)
CALCIUM SERPL-MCNC: 9.1 MG/DL (ref 8.6–10.3)
CHLORIDE SERPL-SCNC: 108 MMOL/L (ref 98–107)
CHOLEST SERPL-MCNC: 129 MG/DL (ref 0–199)
CHOLESTEROL/HDL RATIO: 3.4
CO2 SERPL-SCNC: 23 MMOL/L (ref 21–32)
CREAT SERPL-MCNC: 1.46 MG/DL (ref 0.5–1.3)
EOSINOPHIL # BLD AUTO: 0.42 X10*3/UL (ref 0–0.4)
EOSINOPHIL NFR BLD AUTO: 5.6 %
ERYTHROCYTE [DISTWIDTH] IN BLOOD BY AUTOMATED COUNT: 15.8 % (ref 11.5–14.5)
EST. AVERAGE GLUCOSE BLD GHB EST-MCNC: 123 MG/DL
FERRITIN SERPL-MCNC: 134 NG/ML (ref 20–300)
GFR SERPL CREATININE-BSD FRML MDRD: 45 ML/MIN/1.73M*2
GLUCOSE SERPL-MCNC: 118 MG/DL (ref 74–99)
HBA1C MFR BLD: 5.9 %
HCT VFR BLD AUTO: 35.4 % (ref 41–52)
HDLC SERPL-MCNC: 38 MG/DL
HGB BLD-MCNC: 11.2 G/DL (ref 13.5–17.5)
IMM GRANULOCYTES # BLD AUTO: 0.02 X10*3/UL (ref 0–0.5)
IMM GRANULOCYTES NFR BLD AUTO: 0.3 % (ref 0–0.9)
IRON SATN MFR SERPL: 21 % (ref 25–45)
IRON SERPL-MCNC: 55 UG/DL (ref 35–150)
LDLC SERPL CALC-MCNC: 63 MG/DL (ref 140–190)
LYMPHOCYTES # BLD AUTO: 1.1 X10*3/UL (ref 0.8–3)
LYMPHOCYTES NFR BLD AUTO: 14.7 %
MCH RBC QN AUTO: 33.6 PG (ref 26–34)
MCHC RBC AUTO-ENTMCNC: 31.6 G/DL (ref 32–36)
MCV RBC AUTO: 106 FL (ref 80–100)
MONOCYTES # BLD AUTO: 0.82 X10*3/UL (ref 0.05–0.8)
MONOCYTES NFR BLD AUTO: 10.9 %
NEUTROPHILS # BLD AUTO: 5.12 X10*3/UL (ref 1.6–5.5)
NEUTROPHILS NFR BLD AUTO: 68.2 %
NON HDL CHOLESTEROL: 91 MG/DL (ref 0–149)
NRBC BLD-RTO: 0 /100 WBCS (ref 0–0)
PLATELET # BLD AUTO: 195 X10*3/UL (ref 150–450)
PMV BLD AUTO: 10 FL (ref 7.5–11.5)
POTASSIUM SERPL-SCNC: 3.8 MMOL/L (ref 3.5–5.3)
PROT SERPL-MCNC: 7.2 G/DL (ref 6.4–8.2)
RBC # BLD AUTO: 3.33 X10*6/UL (ref 4.5–5.9)
SODIUM SERPL-SCNC: 139 MMOL/L (ref 136–145)
T4 FREE SERPL-MCNC: 0.75 NG/DL (ref 0.61–1.12)
TIBC SERPL-MCNC: 263 UG/DL (ref 240–445)
TRIGL SERPL-MCNC: 139 MG/DL (ref 0–149)
TSH SERPL-ACNC: 6.02 MIU/L (ref 0.44–3.98)
UIBC SERPL-MCNC: 208 UG/DL (ref 110–370)
VIT B12 SERPL-MCNC: 795 PG/ML (ref 211–911)
VLDL: 28 MG/DL (ref 0–40)
WBC # BLD AUTO: 7.5 X10*3/UL (ref 4.4–11.3)

## 2023-10-11 PROCEDURE — 83550 IRON BINDING TEST: CPT

## 2023-10-11 PROCEDURE — 83036 HEMOGLOBIN GLYCOSYLATED A1C: CPT

## 2023-10-11 PROCEDURE — 84443 ASSAY THYROID STIM HORMONE: CPT

## 2023-10-11 PROCEDURE — 82728 ASSAY OF FERRITIN: CPT

## 2023-10-11 PROCEDURE — 84439 ASSAY OF FREE THYROXINE: CPT

## 2023-10-11 PROCEDURE — 85025 COMPLETE CBC W/AUTO DIFF WBC: CPT

## 2023-10-11 PROCEDURE — 80053 COMPREHEN METABOLIC PANEL: CPT

## 2023-10-11 PROCEDURE — 82607 VITAMIN B-12: CPT

## 2023-10-11 PROCEDURE — 83540 ASSAY OF IRON: CPT

## 2023-10-11 PROCEDURE — 80061 LIPID PANEL: CPT

## 2023-10-11 PROCEDURE — 36415 COLL VENOUS BLD VENIPUNCTURE: CPT

## 2023-10-13 ENCOUNTER — OFFICE VISIT (OUTPATIENT)
Dept: PRIMARY CARE | Facility: CLINIC | Age: 88
End: 2023-10-13
Payer: MEDICARE

## 2023-10-13 VITALS
HEIGHT: 68 IN | SYSTOLIC BLOOD PRESSURE: 92 MMHG | HEART RATE: 66 BPM | BODY MASS INDEX: 25.6 KG/M2 | DIASTOLIC BLOOD PRESSURE: 52 MMHG | WEIGHT: 168.9 LBS | OXYGEN SATURATION: 95 %

## 2023-10-13 DIAGNOSIS — Z23 NEED FOR IMMUNIZATION AGAINST INFLUENZA: ICD-10-CM

## 2023-10-13 DIAGNOSIS — N18.30 TYPE 2 DIABETES MELLITUS WITH STAGE 3 CHRONIC KIDNEY DISEASE, WITHOUT LONG-TERM CURRENT USE OF INSULIN, UNSPECIFIED WHETHER STAGE 3A OR 3B CKD (MULTI): Primary | ICD-10-CM

## 2023-10-13 DIAGNOSIS — M19.90 ARTHRITIS: ICD-10-CM

## 2023-10-13 DIAGNOSIS — D50.9 IRON DEFICIENCY ANEMIA, UNSPECIFIED IRON DEFICIENCY ANEMIA TYPE: ICD-10-CM

## 2023-10-13 DIAGNOSIS — E11.22 TYPE 2 DIABETES MELLITUS WITH STAGE 3 CHRONIC KIDNEY DISEASE, WITHOUT LONG-TERM CURRENT USE OF INSULIN, UNSPECIFIED WHETHER STAGE 3A OR 3B CKD (MULTI): Primary | ICD-10-CM

## 2023-10-13 DIAGNOSIS — E03.9 ACQUIRED HYPOTHYROIDISM: ICD-10-CM

## 2023-10-13 DIAGNOSIS — C67.9 MALIGNANT NEOPLASM OF URINARY BLADDER, UNSPECIFIED SITE (MULTI): ICD-10-CM

## 2023-10-13 DIAGNOSIS — N18.31 STAGE 3A CHRONIC KIDNEY DISEASE (MULTI): ICD-10-CM

## 2023-10-13 PROCEDURE — 90662 IIV NO PRSV INCREASED AG IM: CPT | Performed by: FAMILY MEDICINE

## 2023-10-13 PROCEDURE — 99214 OFFICE O/P EST MOD 30 MIN: CPT | Performed by: FAMILY MEDICINE

## 2023-10-13 PROCEDURE — 1160F RVW MEDS BY RX/DR IN RCRD: CPT | Performed by: FAMILY MEDICINE

## 2023-10-13 PROCEDURE — 1036F TOBACCO NON-USER: CPT | Performed by: FAMILY MEDICINE

## 2023-10-13 PROCEDURE — 1159F MED LIST DOCD IN RCRD: CPT | Performed by: FAMILY MEDICINE

## 2023-10-13 PROCEDURE — G0008 ADMIN INFLUENZA VIRUS VAC: HCPCS | Performed by: FAMILY MEDICINE

## 2023-10-13 NOTE — PROGRESS NOTES
Subjective   Lucio Hernandez is a 89 y.o. male who presents for No chief complaint on file..  Here with his daughter for f/u DM, hypothyroidism, h/o subdural hematoma, bladder cancer, anemia.  They have been going to walk a couple of times a week at an indoor track.  He is doing pretty well, no specific concerns at this time.               Objective   Visit Vitals  BP 92/52 (BP Location: Left arm, Patient Position: Sitting, BP Cuff Size: Adult)   Pulse 66      Physical Exam  Vitals reviewed.   Constitutional:       General: He is not in acute distress.  Cardiovascular:      Rate and Rhythm: Normal rate and regular rhythm.      Heart sounds: No murmur heard.  Pulmonary:      Effort: Pulmonary effort is normal. No respiratory distress.      Breath sounds: Normal breath sounds.   Skin:     General: Skin is warm and dry.   Neurological:      General: No focal deficit present.      Mental Status: He is alert. Mental status is at baseline.        Latest Reference Range & Units 10/11/23 08:52   GLUCOSE 74 - 99 mg/dL 118 (H)   SODIUM 136 - 145 mmol/L 139   POTASSIUM 3.5 - 5.3 mmol/L 3.8   CHLORIDE 98 - 107 mmol/L 108 (H)   Bicarbonate 21 - 32 mmol/L 23   Anion Gap 10 - 20 mmol/L 12   Blood Urea Nitrogen 6 - 23 mg/dL 31 (H)   Creatinine 0.50 - 1.30 mg/dL 1.46 (H)   EGFR >60 mL/min/1.73m*2 45 (L)   Calcium 8.6 - 10.3 mg/dL 9.1   Albumin 3.4 - 5.0 g/dL 3.8   Alkaline Phosphatase 33 - 136 U/L 47   ALT 10 - 52 U/L 6 (L)   AST 9 - 39 U/L 9   Bilirubin Total 0.0 - 1.2 mg/dL 0.4   HDL CHOLESTEROL mg/dL 38.0   Cholesterol/HDL Ratio  3.4   LDL Calculated 140 - 190 mg/dL 63 (L)   VLDL 0 - 40 mg/dL 28   TRIGLYCERIDES 0 - 149 mg/dL 139   Non HDL Cholesterol 0 - 149 mg/dL 91   FERRITIN 20 - 300 ng/mL 134   Total Protein 6.4 - 8.2 g/dL 7.2   IRON 35 - 150 ug/dL 55   CHOLESTEROL 0 - 199 mg/dL 129   TIBC 240 - 445 ug/dL 263   UIBC 110 - 370 ug/dL 208   % Saturation 25 - 45 % 21 (L)   Vitamin B12 211 - 911 pg/mL 795   Hemoglobin A1C see below %  5.9 (H)   Thyroxine, Free 0.61 - 1.12 ng/dL 0.75   Thyroid Stimulating Hormone 0.44 - 3.98 mIU/L 6.02 (H)   Estimated Average Glucose Not Established mg/dL 123   WBC 4.4 - 11.3 x10*3/uL 7.5   nRBC 0.0 - 0.0 /100 WBCs 0.0   RBC 4.50 - 5.90 x10*6/uL 3.33 (L)   HEMOGLOBIN 13.5 - 17.5 g/dL 11.2 (L)   HEMATOCRIT 41.0 - 52.0 % 35.4 (L)   MCV 80 - 100 fL 106 (H)   MCH 26.0 - 34.0 pg 33.6   MCHC 32.0 - 36.0 g/dL 31.6 (L)   RED CELL DISTRIBUTION WIDTH 11.5 - 14.5 % 15.8 (H)   Platelets 150 - 450 x10*3/uL 195   MEAN PLATELET VOLUME 7.5 - 11.5 fL 10.0   Neutrophils % 40.0 - 80.0 % 68.2   Immature Granulocytes %, Automated 0.0 - 0.9 % 0.3   Lymphocytes % 13.0 - 44.0 % 14.7   Monocytes % 2.0 - 10.0 % 10.9   Eosinophils % 0.0 - 6.0 % 5.6   Basophils % 0.0 - 2.0 % 0.3   Neutrophils Absolute 1.60 - 5.50 x10*3/uL 5.12   Immature Granulocytes Absolute, Automated 0.00 - 0.50 x10*3/uL 0.02   Lymphocytes Absolute 0.80 - 3.00 x10*3/uL 1.10   Monocytes Absolute 0.05 - 0.80 x10*3/uL 0.82 (H)   Eosinophils Absolute 0.00 - 0.40 x10*3/uL 0.42 (H)   Basophils Absolute 0.00 - 0.10 x10*3/uL 0.02   (H): Data is abnormally high  (L): Data is abnormally low    Assessment/Plan   Problem List Items Addressed This Visit       Arthritis    Relevant Orders    Follow Up In Primary Care - Medicare Annual    Diabetes mellitus (CMS/HCC) - Primary    Relevant Orders    Follow Up In Primary Care - Medicare Annual    Hypothyroidism    Relevant Orders    Follow Up In Primary Care - Medicare Annual    Malignant neoplasm of bladder, unspecified (CMS/HCC)    Relevant Orders    Follow Up In Primary Care - Medicare Annual    Stage 3a chronic kidney disease (CMS/HCC)    Relevant Orders    Follow Up In Primary Care - Medicare Annual          Natalia Dejesus MD

## 2023-12-12 DIAGNOSIS — Z86.79 HISTORY OF MITRAL VALVE PROLAPSE: ICD-10-CM

## 2023-12-12 RX ORDER — PROPRANOLOL HYDROCHLORIDE 10 MG/1
10 TABLET ORAL 2 TIMES DAILY
Qty: 180 TABLET | Refills: 1 | Status: SHIPPED | OUTPATIENT
Start: 2023-12-12 | End: 2024-06-10

## 2023-12-13 ENCOUNTER — PHARMACY VISIT (OUTPATIENT)
Dept: PHARMACY | Facility: CLINIC | Age: 88
End: 2023-12-13

## 2023-12-13 PROCEDURE — RXMED WILLOW AMBULATORY MEDICATION CHARGE

## 2023-12-13 RX ORDER — PROPRANOLOL HYDROCHLORIDE 10 MG/1
10 TABLET ORAL 2 TIMES DAILY
Qty: 180 TABLET | Refills: 1 | OUTPATIENT
Start: 2023-12-12 | End: 2024-02-07 | Stop reason: SDUPTHER

## 2024-02-05 ENCOUNTER — TELEPHONE (OUTPATIENT)
Dept: PRIMARY CARE | Facility: CLINIC | Age: 89
End: 2024-02-05
Payer: MEDICARE

## 2024-02-05 DIAGNOSIS — R31.0 GROSS HEMATURIA: Primary | ICD-10-CM

## 2024-02-05 NOTE — TELEPHONE ENCOUNTER
Patients daughter called in stating for the last week, the patient has been throwing blood clots through their urine. The patient isn't suppose to be seen until April. They would like to do the blood work tomorrow for that appointment, they think the patients iron is down. They would also like to do a urine test as well but there is no orders.

## 2024-02-06 ENCOUNTER — LAB (OUTPATIENT)
Dept: LAB | Facility: LAB | Age: 89
End: 2024-02-06
Payer: MEDICARE

## 2024-02-06 DIAGNOSIS — R31.0 GROSS HEMATURIA: ICD-10-CM

## 2024-02-06 DIAGNOSIS — N18.31 STAGE 3A CHRONIC KIDNEY DISEASE (MULTI): ICD-10-CM

## 2024-02-06 DIAGNOSIS — E03.9 ACQUIRED HYPOTHYROIDISM: ICD-10-CM

## 2024-02-06 LAB
ALBUMIN SERPL BCP-MCNC: 3.9 G/DL (ref 3.4–5)
ALP SERPL-CCNC: 43 U/L (ref 33–136)
ALT SERPL W P-5'-P-CCNC: 6 U/L (ref 10–52)
ANION GAP SERPL CALC-SCNC: 12 MMOL/L (ref 10–20)
APPEARANCE UR: ABNORMAL
AST SERPL W P-5'-P-CCNC: 9 U/L (ref 9–39)
BASOPHILS # BLD AUTO: 0.02 X10*3/UL (ref 0–0.1)
BASOPHILS NFR BLD AUTO: 0.3 %
BILIRUB SERPL-MCNC: 0.3 MG/DL (ref 0–1.2)
BILIRUB UR STRIP.AUTO-MCNC: NEGATIVE MG/DL
BUN SERPL-MCNC: 31 MG/DL (ref 6–23)
CALCIUM SERPL-MCNC: 8.9 MG/DL (ref 8.6–10.3)
CHLORIDE SERPL-SCNC: 107 MMOL/L (ref 98–107)
CO2 SERPL-SCNC: 25 MMOL/L (ref 21–32)
COLOR UR: ABNORMAL
CREAT SERPL-MCNC: 1.53 MG/DL (ref 0.5–1.3)
EGFRCR SERPLBLD CKD-EPI 2021: 43 ML/MIN/1.73M*2
EOSINOPHIL # BLD AUTO: 0.46 X10*3/UL (ref 0–0.4)
EOSINOPHIL NFR BLD AUTO: 5.9 %
ERYTHROCYTE [DISTWIDTH] IN BLOOD BY AUTOMATED COUNT: 13.6 % (ref 11.5–14.5)
GLUCOSE SERPL-MCNC: 109 MG/DL (ref 74–99)
GLUCOSE UR STRIP.AUTO-MCNC: NEGATIVE MG/DL
HCT VFR BLD AUTO: 32 % (ref 41–52)
HGB BLD-MCNC: 10.5 G/DL (ref 13.5–17.5)
IMM GRANULOCYTES # BLD AUTO: 0.03 X10*3/UL (ref 0–0.5)
IMM GRANULOCYTES NFR BLD AUTO: 0.4 % (ref 0–0.9)
KETONES UR STRIP.AUTO-MCNC: NEGATIVE MG/DL
LEUKOCYTE ESTERASE UR QL STRIP.AUTO: ABNORMAL
LYMPHOCYTES # BLD AUTO: 1.18 X10*3/UL (ref 0.8–3)
LYMPHOCYTES NFR BLD AUTO: 15.2 %
MCH RBC QN AUTO: 35.1 PG (ref 26–34)
MCHC RBC AUTO-ENTMCNC: 32.8 G/DL (ref 32–36)
MCV RBC AUTO: 107 FL (ref 80–100)
MONOCYTES # BLD AUTO: 0.83 X10*3/UL (ref 0.05–0.8)
MONOCYTES NFR BLD AUTO: 10.7 %
NEUTROPHILS # BLD AUTO: 5.23 X10*3/UL (ref 1.6–5.5)
NEUTROPHILS NFR BLD AUTO: 67.5 %
NITRITE UR QL STRIP.AUTO: NEGATIVE
NRBC BLD-RTO: 0 /100 WBCS (ref 0–0)
PH UR STRIP.AUTO: 5 [PH]
PHOSPHATE SERPL-MCNC: 3.8 MG/DL (ref 2.5–4.9)
PLATELET # BLD AUTO: 175 X10*3/UL (ref 150–450)
POTASSIUM SERPL-SCNC: 4.1 MMOL/L (ref 3.5–5.3)
PROT SERPL-MCNC: 6.4 G/DL (ref 6.4–8.2)
PROT UR STRIP.AUTO-MCNC: ABNORMAL MG/DL
RBC # BLD AUTO: 2.99 X10*6/UL (ref 4.5–5.9)
RBC # UR STRIP.AUTO: ABNORMAL /UL
RBC #/AREA URNS AUTO: >20 /HPF
SODIUM SERPL-SCNC: 140 MMOL/L (ref 136–145)
SP GR UR STRIP.AUTO: 1.02
UROBILINOGEN UR STRIP.AUTO-MCNC: <2 MG/DL
WBC # BLD AUTO: 7.8 X10*3/UL (ref 4.4–11.3)
WBC #/AREA URNS AUTO: >50 /HPF

## 2024-02-06 PROCEDURE — 87086 URINE CULTURE/COLONY COUNT: CPT

## 2024-02-06 PROCEDURE — 36415 COLL VENOUS BLD VENIPUNCTURE: CPT

## 2024-02-06 PROCEDURE — 84100 ASSAY OF PHOSPHORUS: CPT

## 2024-02-06 PROCEDURE — 80053 COMPREHEN METABOLIC PANEL: CPT

## 2024-02-06 PROCEDURE — 81001 URINALYSIS AUTO W/SCOPE: CPT

## 2024-02-06 PROCEDURE — 85025 COMPLETE CBC W/AUTO DIFF WBC: CPT

## 2024-02-06 RX ORDER — LEVOTHYROXINE SODIUM 88 UG/1
88 TABLET ORAL
Qty: 90 TABLET | Refills: 1 | Status: SHIPPED | OUTPATIENT
Start: 2024-02-06 | End: 2024-06-10

## 2024-02-06 NOTE — RESULT ENCOUNTER NOTE
Please let patient daughter know that he is still anemic but is stable - hgb is 10.5.  Other labs are stable and urine culture is pending so we will call when that is back,  Thanks.

## 2024-02-07 ENCOUNTER — OFFICE VISIT (OUTPATIENT)
Dept: NEPHROLOGY | Facility: CLINIC | Age: 89
End: 2024-02-07
Payer: MEDICARE

## 2024-02-07 VITALS
HEIGHT: 68 IN | SYSTOLIC BLOOD PRESSURE: 116 MMHG | HEART RATE: 64 BPM | DIASTOLIC BLOOD PRESSURE: 62 MMHG | BODY MASS INDEX: 26.16 KG/M2 | WEIGHT: 172.6 LBS

## 2024-02-07 DIAGNOSIS — N18.32 STAGE 3B CHRONIC KIDNEY DISEASE (MULTI): Primary | ICD-10-CM

## 2024-02-07 DIAGNOSIS — D50.9 IRON DEFICIENCY ANEMIA, UNSPECIFIED IRON DEFICIENCY ANEMIA TYPE: ICD-10-CM

## 2024-02-07 DIAGNOSIS — N02.8 RECURRENT AND PERSISTENT HEMATURIA WITH OTHER MORPHOLOGIC CHANGES: ICD-10-CM

## 2024-02-07 PROBLEM — N18.30 CHRONIC KIDNEY DISEASE (CKD), STAGE III (MODERATE) (MULTI): Status: ACTIVE | Noted: 2023-02-02

## 2024-02-07 LAB — BACTERIA UR CULT: ABNORMAL

## 2024-02-07 ASSESSMENT — ENCOUNTER SYMPTOMS
NEUROLOGICAL NEGATIVE: 1
RESPIRATORY NEGATIVE: 1
CONSTITUTIONAL NEGATIVE: 1
CARDIOVASCULAR NEGATIVE: 1
ENDOCRINE NEGATIVE: 1
GASTROINTESTINAL NEGATIVE: 1
PSYCHIATRIC NEGATIVE: 1
MUSCULOSKELETAL NEGATIVE: 1

## 2024-02-07 NOTE — RESULT ENCOUNTER NOTE
His urine culture showed likely contamination - if he is still having symptoms I would recommend we get another urine culture.  Thanks.

## 2024-02-07 NOTE — ASSESSMENT & PLAN NOTE
Urinalysis positive for RBCs, also positive for WBCs, urine culture pending, has follow-up with urology on the 19th

## 2024-02-07 NOTE — PROGRESS NOTES
"Subjective   Patient ID: Bruce Hernandez \"Lucio\" is a 89 y.o. male who presents for Follow-up (6 month ck/Review labs 2/6).  Patient being seen in follow-up for chronic kidney disease stage IIIb with history of hypertension and diabetes mellitus    Labs reviewed  H&H 10.5 and 32.0 WBC 7.8  CMP with glucose of 109  Sodium 140, potassium 4.1, chloride 107, bicarb 25  Renal function with BUN of 31 and creatinine of 1.53  Urinalysis hazy and positive for protein with a large amount of blood  WBCs greater than 50  Urine culture pending  Phosphorus 3.8    He is doing well  He is walking about 1 mile twice a week  He denies any lightheadedness or dizziness  He has had some discoloration of his urine and urine culture is pending  He denies any other symptoms with this  He also has made an appointment with his urologist secondary to his hematuria  He does not have any swelling          Review of Systems   Constitutional: Negative.    Respiratory: Negative.     Cardiovascular: Negative.    Gastrointestinal: Negative.    Endocrine: Negative.    Genitourinary: Negative.    Musculoskeletal: Negative.    Skin: Negative.    Neurological: Negative.    Psychiatric/Behavioral: Negative.         Objective   Physical Exam  Vitals reviewed.   Constitutional:       Appearance: Normal appearance.   HENT:      Head: Normocephalic.   Cardiovascular:      Rate and Rhythm: Normal rate and regular rhythm.   Pulmonary:      Effort: Pulmonary effort is normal.      Breath sounds: Normal breath sounds.   Abdominal:      Palpations: Abdomen is soft.   Musculoskeletal:         General: Normal range of motion.      Comments: Walks with a cane   Skin:     General: Skin is warm and dry.   Neurological:      Mental Status: He is alert and oriented to person, place, and time.   Psychiatric:         Mood and Affect: Mood normal.         Behavior: Behavior normal.         Assessment/Plan   Problem List Items Addressed This Visit             ICD-10-CM    " Chronic kidney disease (CKD), stage III (moderate) (CMS/HCC) - Primary N18.30     Renal function is stable with creatinine of 1.53, he is staying within his baseline         Relevant Orders    Basic metabolic panel    Urinalysis with Reflex Microscopic    Albumin , Urine Random    Iron deficiency anemia D50.9     Hemoglobin is stable at 10.5, will continue with ferrous sulfate and will continue to monitor         Recurrent and persistent hematuria with other morphologic changes N02.8     Urinalysis positive for RBCs, also positive for WBCs, urine culture pending, has follow-up with urology on the 19th          Chronic Kidney Disease - Stage 3b, With creatinine ranging from 1.4-1.7  HTN  DM - not taking any home medications  COPD  History of Bladder and Prostate CA  Hypothyroidism  Mitral valve Prolapse  Macrocytic anemia  Recent COVID 19  Recent UTI           NEDRA Wisdom-HARLEY, DNP 02/07/24 10:58 AM

## 2024-02-08 ENCOUNTER — APPOINTMENT (OUTPATIENT)
Dept: NEPHROLOGY | Facility: CLINIC | Age: 89
End: 2024-02-08
Payer: MEDICARE

## 2024-04-17 ENCOUNTER — APPOINTMENT (OUTPATIENT)
Dept: PRIMARY CARE | Facility: CLINIC | Age: 89
End: 2024-04-17
Payer: MEDICARE

## 2024-05-24 ENCOUNTER — LAB REQUISITION (OUTPATIENT)
Dept: LAB | Facility: HOSPITAL | Age: 89
End: 2024-05-24
Payer: MEDICARE

## 2024-05-24 DIAGNOSIS — R41.0 DISORIENTATION, UNSPECIFIED: ICD-10-CM

## 2024-05-24 LAB
APPEARANCE UR: ABNORMAL
BACTERIA #/AREA URNS AUTO: ABNORMAL /HPF
BILIRUB UR STRIP.AUTO-MCNC: NEGATIVE MG/DL
COLOR UR: YELLOW
GLUCOSE UR STRIP.AUTO-MCNC: NEGATIVE MG/DL
KETONES UR STRIP.AUTO-MCNC: NEGATIVE MG/DL
LEUKOCYTE ESTERASE UR QL STRIP.AUTO: ABNORMAL
NITRITE UR QL STRIP.AUTO: NEGATIVE
PH UR STRIP.AUTO: 5 [PH]
PROT UR STRIP.AUTO-MCNC: ABNORMAL MG/DL
RBC # UR STRIP.AUTO: ABNORMAL /UL
RBC #/AREA URNS AUTO: >20 /HPF
SP GR UR STRIP.AUTO: 1.01
SQUAMOUS #/AREA URNS AUTO: ABNORMAL /HPF
UROBILINOGEN UR STRIP.AUTO-MCNC: <2 MG/DL
WBC #/AREA URNS AUTO: >50 /HPF
WBC CLUMPS #/AREA URNS AUTO: ABNORMAL /HPF

## 2024-05-24 PROCEDURE — 81003 URINALYSIS AUTO W/O SCOPE: CPT | Mod: OUT | Performed by: INTERNAL MEDICINE

## 2024-05-24 PROCEDURE — 87086 URINE CULTURE/COLONY COUNT: CPT | Mod: OUT,SAMLAB | Performed by: INTERNAL MEDICINE

## 2024-05-27 LAB — BACTERIA UR CULT: ABNORMAL

## 2024-06-02 PROCEDURE — 87086 URINE CULTURE/COLONY COUNT: CPT | Mod: OUT,SAMLAB | Performed by: INTERNAL MEDICINE

## 2024-06-02 PROCEDURE — 81003 URINALYSIS AUTO W/O SCOPE: CPT | Mod: OUT | Performed by: INTERNAL MEDICINE

## 2024-06-03 ENCOUNTER — LAB REQUISITION (OUTPATIENT)
Dept: LAB | Facility: HOSPITAL | Age: 89
End: 2024-06-03
Payer: MEDICARE

## 2024-06-03 DIAGNOSIS — N39.0 URINARY TRACT INFECTION, SITE NOT SPECIFIED: ICD-10-CM

## 2024-06-03 LAB
APPEARANCE UR: ABNORMAL
BACTERIA #/AREA URNS AUTO: ABNORMAL /HPF
BILIRUB UR STRIP.AUTO-MCNC: NEGATIVE MG/DL
COLOR UR: YELLOW
GLUCOSE UR STRIP.AUTO-MCNC: NEGATIVE MG/DL
KETONES UR STRIP.AUTO-MCNC: NEGATIVE MG/DL
LEUKOCYTE ESTERASE UR QL STRIP.AUTO: ABNORMAL
MUCOUS THREADS #/AREA URNS AUTO: ABNORMAL /LPF
NITRITE UR QL STRIP.AUTO: NEGATIVE
PH UR STRIP.AUTO: 5 [PH]
PROT UR STRIP.AUTO-MCNC: ABNORMAL MG/DL
RBC # UR STRIP.AUTO: ABNORMAL /UL
RBC #/AREA URNS AUTO: >20 /HPF
SP GR UR STRIP.AUTO: 1.02
UROBILINOGEN UR STRIP.AUTO-MCNC: <2 MG/DL
WBC #/AREA URNS AUTO: >50 /HPF
WBC CLUMPS #/AREA URNS AUTO: ABNORMAL /HPF

## 2024-06-07 LAB
BACTERIA UR CULT: ABNORMAL
BACTERIA UR CULT: ABNORMAL

## 2024-08-07 ENCOUNTER — APPOINTMENT (OUTPATIENT)
Dept: NEPHROLOGY | Facility: CLINIC | Age: 89
End: 2024-08-07
Payer: MEDICARE